# Patient Record
Sex: FEMALE | Race: WHITE | Employment: FULL TIME | URBAN - METROPOLITAN AREA
[De-identification: names, ages, dates, MRNs, and addresses within clinical notes are randomized per-mention and may not be internally consistent; named-entity substitution may affect disease eponyms.]

---

## 2017-09-01 ENCOUNTER — OFFICE VISIT (OUTPATIENT)
Dept: FAMILY MEDICINE CLINIC | Age: 52
End: 2017-09-01

## 2017-09-01 VITALS
BODY MASS INDEX: 23.14 KG/M2 | HEIGHT: 66 IN | WEIGHT: 144 LBS | OXYGEN SATURATION: 99 % | DIASTOLIC BLOOD PRESSURE: 70 MMHG | HEART RATE: 74 BPM | TEMPERATURE: 98.4 F | RESPIRATION RATE: 18 BRPM | SYSTOLIC BLOOD PRESSURE: 112 MMHG

## 2017-09-01 DIAGNOSIS — G90.511 REFLEX SYMPATHETIC DYSTROPHY OF RIGHT UPPER EXTREMITY: Primary | ICD-10-CM

## 2017-09-01 NOTE — PATIENT INSTRUCTIONS

## 2017-09-01 NOTE — PROGRESS NOTES
Pt states [facility] for right hand swelling and tenderness.  States hand will get very cold and painful at times

## 2017-09-01 NOTE — PROGRESS NOTES
Camryn Brown is a 46 y.o. female   Chief Complaint   Patient presents with    Hand Pain    pt states she had hand swelling a month ago and [facility] and [facility] and was told tendinitis. Pt states she has recently been getting an odd cold sensation that goes up her arm discomfort at times and swelling. Pt does type at work all day. Pt reports having woken up with her R 2nd digit hyperextended approx 1.5 months ago, the issues she has today arose shortly after    she is a 46y.o. year old female who presents for evalution. Reviewed PmHx, RxHx, FmHx, SocHx, AllgHx and updated and dated in the chart. Review of Systems - negative except as listed above in the HPI    Objective:     Vitals:    09/01/17 1330   BP: 112/70   Pulse: 74   Resp: 18   Temp: 98.4 °F (36.9 °C)   TempSrc: Oral   SpO2: 99%   Weight: 144 lb (65.3 kg)   Height: 5' 6\" (1.676 m)       Current Outpatient Prescriptions   Medication Sig    Estradiol (DIVIGEL) 1 mg/gram (0.1 %) glpk by TransDERmal route.  levothyroxine (SYNTHROID) 50 mcg tablet Take  by mouth Daily (before breakfast).  loratadine-pseudoephedrine (WAL-ITIN D)  mg per tablet Take 1 Tab by mouth daily.  multivitamin (ONE A DAY) tablet Take 1 Tab by mouth daily.  LEVONORGESTREL-ETHIN ESTRADIOL (TRIVORA, 28, PO) Take  by mouth.  SUMAtriptan (IMITREX) 50 mg tablet Take 1 Tab by mouth once as needed for Migraine for up to 1 dose. No current facility-administered medications for this visit.         Physical Examination: General appearance - alert, well appearing, and in no distress  Eyes - pupils equal and reactive, extraocular eye movements intact  Chest - clear to auscultation, no wheezes, rales or rhonchi, symmetric air entry  Heart - normal rate, regular rhythm, normal S1, S2, no murmurs, rubs, clicks or gallops  Musculoskeletal - normal exam of hand other than R hand slightly cooler to touch than L but no discoloration and 2+ pulses present bilaterally radial artery      Assessment/ Plan:   Diagnoses and all orders for this visit:    1. Reflex sympathetic dystrophy of right upper extremity  -     REFERRAL TO NEUROLOGY       Follow-up Disposition:  Return if symptoms worsen or fail to improve. I have discussed the diagnosis with the patient and the intended plan as seen in the above orders. The patient has received an after-visit summary and questions were answered concerning future plans. Pt conveyed understanding of plan.     Medication Side Effects and Warnings were discussed with patient      Tianna Riggs, DO

## 2017-09-06 ENCOUNTER — OFFICE VISIT (OUTPATIENT)
Dept: NEUROLOGY | Age: 52
End: 2017-09-06

## 2017-09-06 VITALS — DIASTOLIC BLOOD PRESSURE: 86 MMHG | OXYGEN SATURATION: 96 % | SYSTOLIC BLOOD PRESSURE: 118 MMHG | HEART RATE: 77 BPM

## 2017-09-06 DIAGNOSIS — M79.601 PAIN OF RIGHT UPPER EXTREMITY: Primary | ICD-10-CM

## 2017-09-06 DIAGNOSIS — R20.2 PARESTHESIA: ICD-10-CM

## 2017-09-06 RX ORDER — LANOLIN ALCOHOL/MO/W.PET/CERES
1000 CREAM (GRAM) TOPICAL DAILY
COMMUNITY

## 2017-09-06 NOTE — PROGRESS NOTES
NEUROLOGY NEW PATIENT OFFICE CONSULTATION      9/6/2017    RE: Lakisha Davila         1965      REFERRED BY:  Angela Freeman MD        CHIEF COMPLAINT:  This is Lakisha Davila is a 46 y.o. 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 HPI:     Since 2008, patient will have R sided neck and shoulder pain. 1 months ago, patient noted swelling of the R hand. [facility] R hand was said to be okay. Since then, noted numbness and coldness of the R hand with pain. (+) weakness of . Patient saw a neurologist in the past (Dr Pop) where EMG/NCS as per patient was okay. Tried physical therapy. Chiropractor helps. Review of Systems   Constitutional: Negative for chills and fever. Skin: Negative for rash. All other systems reviewed and are negative    Past Medical Hx  Past Medical History:   Diagnosis Date    Headache     Seasonal allergic reaction 7/19/2010       Social Hx  Social History     Social History    Marital status:      Spouse name: N/A    Number of children: N/A    Years of education: N/A     Social History Main Topics    Smoking status: Never Smoker    Smokeless tobacco: Never Used    Alcohol use Yes      Comment: Rare    Drug use: No    Sexual activity: Not Asked     Other Topics Concern    None     Social History Narrative       Family Hx  Family History   Problem Relation Age of Onset    Family history unknown: Yes       ALLERGIES  Allergies   Allergen Reactions    Minocycline Other (comments)     Dizziness       CURRENT MEDS  Current Outpatient Prescriptions   Medication Sig Dispense Refill    cyanocobalamin 1,000 mcg tablet Take 1,000 mcg by mouth daily.  sulfacetamide sodium 10 % topical cream Apply  to affected area two (2) times a day.  Estradiol (DIVIGEL) 1 mg/gram (0.1 %) glpk by TransDERmal route.       levothyroxine (SYNTHROID) 50 mcg tablet Take  by mouth Daily (before breakfast).  loratadine-pseudoephedrine (WAL-ITIN D)  mg per tablet Take 1 Tab by mouth daily.  multivitamin (ONE A DAY) tablet Take 1 Tab by mouth daily.  LEVONORGESTREL-ETHIN ESTRADIOL (TRIVORA, 28, PO) Take  by mouth.  SUMAtriptan (IMITREX) 50 mg tablet Take 1 Tab by mouth once as needed for Migraine for up to 1 dose. 6 Tab 4           PREVIOUS WORKUP: (reviewed)  IMAGING:    CT Results (recent):    Results from Hospital Encounter encounter on 04/18/16   CT HEAD WO CONT   Narrative **Final Report**      ICD Codes / Adm. Diagnosis: 346.21  G43. C1 / Variants of migraine, not else    Periodic headache syndromes   Examination:  CT HEAD WO CON  - [id number] - Apr 18 2016  9:25AM  Accession No:  [id number]  Reason:  Hadache, change in pattern      REPORT:  Indication:  Headache, change in pattern     Comparison: None    Findings: 5 mm axial images were obtained from the skull base through the   vertex. The ventricles and cortical sulci are appropriate in size and   configuration. There is generalized atrophy. There is no evidence of   intracranial hemorrhage, mass, mass effect, or acute infarct. No extra-axial   fluid collections are seen. The visualized paranasal sinuses and mastoid air   cells are clear. The orbital structures are unremarkable. No osseous   abnormalities are seen. IMPRESSION: No acute intracranial pathology. Signing/Reading Doctor: Hansa Cooley ([id number])    Eugenie Quinteros ([id number])  Apr 18 2016  9:37AM                                      MRI Results (recent):    Results from Hospital Encounter encounter on 01/15/10   MRI ABDOMEN WITH AND WITHOUT CONT   Narrative Final Report      ICD Codes / Adm. Diagnosis:    /   ABNORMAL ULTRAOUNS OF LIVER  Examination:  ABDOMEN W AND WO CON  - [id number] - Roberto 15 2010  7:09PM  Accession No:  [id number]  Reason:  ABNORMAL ULTRAOUNS OF LIVER      REPORT:  Multiplanar pre- and postgadolinium MRI of the abdomen is performed.  A total   of 13 mL of gadolinium was administered intravenously. Direct comparison is made to prior ultrasound of the abdomen performed   01/07/2010. There are multiple very mildly T2 hyperintense lobulated lesions seen within   the liver. The largest is located within the inferior right hepatic lobe   measures 4 cm x 4 cm. There is a suggestion of a central scar within several   of these lesions. These lesions inhomogeneously enhance on the earliest   contrast-enhanced images were not were obtained and the late arterial/portal   venous phase. Lesions demonstrates contrast washout on the more delayed   images. There are innumerable T2 hyperintensities seen throughout the liver, many of   which are too small to further characterize but are likely to represent   cysts. Largest T2 hyperintensity is located within the anterior left hepatic   lobe measures 1.4 cm x 9 mm and is consistent with a cyst.    There is a 1.1 cm cyst within the lower pole of the left kidney. Remaining   visualized upper abdominal structures are normal.       IMPRESSION:  1. Multiple, mildly T2 hyperintense, enhancing hepatic lesions. Differential   diagnosis includes: Multiple adenomas, FNH while metastatic hypervascular   lesions or multifocal HCC is considered less likely. Recommend clinical   correlation. Tc 99m sulfur colloid scan may be helpful for further   evaluation. Percutaneous biopsy could also be attempted, if clinically   indicated. Alternatively three-month short interval followup is recommended   to confirm stability. Interpreting/Reading Doctor: LUZ MARIA BATES ([id number])  Transcribed: n/a on 01/18/2010  Approved: LUZ MARIA Amado ([id number])  01/18/2010          Distribution:  Attending Doctor: Annamarie Jay Doctor: Cameron Ortega            IR Results (recent):  No results found for this or any previous visit. VAS/US Results (recent):  No results found for this or any previous visit.         LABS (reviewed)  Results for orders placed or performed in visit on 04/12/16   CBC WITH AUTOMATED DIFF   Result Value Ref Range    WBC 6.2 3.4 - 10.8 x10E3/uL    RBC 5.09 3.77 - 5.28 x10E6/uL    HGB 13.7 11.1 - 15.9 g/dL    HCT 42.9 34.0 - 46.6 %    MCV 84 79 - 97 fL    MCH 26.9 26.6 - 33.0 pg    MCHC 31.9 31.5 - 35.7 g/dL    RDW 13.9 12.3 - 15.4 %    PLATELET 999 527 - 827 x10E3/uL    NEUTROPHILS 55 %    Lymphocytes 33 %    MONOCYTES 11 %    EOSINOPHILS 1 %    BASOPHILS 0 %    ABS. NEUTROPHILS 3.4 1.4 - 7.0 x10E3/uL    Abs Lymphocytes 2.1 0.7 - 3.1 x10E3/uL    ABS. MONOCYTES 0.7 0.1 - 0.9 x10E3/uL    ABS. EOSINOPHILS 0.1 0.0 - 0.4 x10E3/uL    ABS. BASOPHILS 0.0 0.0 - 0.2 x10E3/uL    IMMATURE GRANULOCYTES 0 %    ABS. IMM. GRANS. 0.0 0.0 - 0.1 K26X3/PL   METABOLIC PANEL, COMPREHENSIVE   Result Value Ref Range    Glucose 89 65 - 99 mg/dL    BUN 11 6 - 24 mg/dL    Creatinine 0.72 0.57 - 1.00 mg/dL    GFR est non-AA 98 >59 mL/min/1.73    GFR est  >59 mL/min/1.73    BUN/Creatinine ratio 15 9 - 23    Sodium 143 134 - 144 mmol/L    Potassium 4.6 3.5 - 5.2 mmol/L    Chloride 101 97 - 108 mmol/L    CO2 26 18 - 29 mmol/L    Calcium 9.1 8.7 - 10.2 mg/dL    Protein, total 6.6 6.0 - 8.5 g/dL    Albumin 4.2 3.5 - 5.5 g/dL    GLOBULIN, TOTAL 2.4 1.5 - 4.5 g/dL    A-G Ratio 1.8 1.1 - 2.5    Bilirubin, total 0.6 0.0 - 1.2 mg/dL    Alk. phosphatase 53 39 - 117 IU/L    AST (SGOT) 17 0 - 40 IU/L    ALT (SGPT) 13 0 - 32 IU/L   TSH 3RD GENERATION   Result Value Ref Range    TSH 1.370 0.450 - 4.500 uIU/mL       Physical Exam:     Visit Vitals    /86    Pulse 77    LMP 04/06/2016 (Exact Date)    SpO2 96%     General:  Alert, cooperative, no distress. Head:  Normocephalic, without obvious abnormality, atraumatic. Eyes:  Conjunctivae/corneas clear.    Lungs:  Heart:   Non labored breathing  Regular rate and rhythm, no carotid bruits   Abdomen:   Soft, non-distended   Extremities: Extremities normal, atraumatic, no cyanosis or edema.   Pulses: 2+ and symmetric all extremities. Skin: Skin color, texture, turgor normal. No rashes or lesions. Neurologic Exam     Gen: Attention normal             Language: naming, repetition, fluency normal             Memory: intact recent and remote memory  Cranial Nerves:  I: smell Not tested   II: visual fields Full to confrontation   II: pupils Equal, round, reactive to light   II: optic disc No papilledema   III,VII: ptosis none   III,IV,VI: extraocular muscles  Full ROM   V: mastication normal   V: facial light touch sensation  normal   VII: facial muscle function   symmetric   VIII: hearing symmetric   IX: soft palate elevation  normal   XI: trapezius strength  5/5   XI: sternocleidomastoid strength 5/5   XI: neck flexion strength  5/5   XII: tongue  midline     Motor: normal bulk and tone, no tremor              Strength: 5/5 all four extremities  (+) point tenderness R neck and R shoulder  Sensory: intact to LT, PP, vibration, and JPS  Reflexes: 2+ throughout; Down going toes  Coordination: Good FTN and HTS  Gait: normal gait including tandem            Impression:     Lakisha Davila is a 46 y.o. female who  has a past medical history of Headache and Seasonal allergic reaction (7/19/2010). and hypothyroid who since 2008,  will have on and off R sided neck and shoulder pain. 1 months ago, patient noted swelling of the R hand. [facility] where [facility]-ray of R hand was said to be okay. Since then, noted numbness and coldness of the R hand with pain and weakness of . Patient should be evaluated for carpal tunnel syndrome and cervical radiculopathy    RECOMMENDATIONS  1. EMG/NCS with CTS protocol  2. If above is negative, advise evaluating patient for vascular (patient scheduled to see a vascular doctor) and rheumatologic etiology of symptoms    Ms. Randon Opitz has a reminder for a \"due or due soon\" health maintenance.  I have asked that she contact her primary care provider for follow-up on this health maintenance. Follow-up Disposition:  Return for above testing. Thank you for the consultation      Emili Sanches MD  Diplomate, American Board of Psychiatry and Neurology  Diplomate, Neuromuscular Medicine  Diplomate, American Board of Electrodiagnostic Medicine    Greater than 50% of time spent counseling patient      CC:  Adeline Bentley MD  Fax: [phone]

## 2017-09-06 NOTE — MR AVS SNAPSHOT
Visit Information Date & Time Provider Department Dept. Phone Encounter #  
 9/6/2017  3:00 PM Carlito Tavares MD [address] [facility] [phone] [id number] Follow-up Instructions Return for above testing. Upcoming Health Maintenance Date Due DTaP/Tdap/Td series (1 - Tdap) 11/9/1986 BREAST CANCER SCRN MAMMOGRAM 11/9/2015 FOBT Q 1 YEAR AGE 50-75 11/9/2015 PAP AKA CERVICAL CYTOLOGY 9/9/2018 Allergies as of 9/6/2017  Review Complete On: 9/6/2017 By: Carlito Tavares MD  
  
 Severity Noted Reaction Type Reactions Minocycline  10/01/2010    Other (comments) Dizziness Current Immunizations  Reviewed on 9/9/2015 No immunizations on file. Not reviewed this visit You Were Diagnosed With   
  
 Codes Comments Pain of right upper extremity    -  Primary ICD-10-CM: M79.601 ICD-9-CM: 729.5 Paresthesia     ICD-10-CM: R20.2 ICD-9-CM: 190. 0 Vitals BP Pulse LMP SpO2 OB Status Smoking Status 118/86 77 04/06/2016 (Exact Date) 96% Hysterectomy Never Smoker Preferred Pharmacy Pharmacy Name Phone [facility], [address] [place] [address] [phone] Your Updated Medication List  
  
   
This list is accurate as of: 9/6/17  3:21 PM.  Always use your most recent med list.  
  
  
  
  
 cyanocobalamin 1,000 mcg tablet Take 1,000 mcg by mouth daily. DIVIGEL 1 mg/gram (0.1 %) Glpk Generic drug:  Estradiol  
by TransDERmal route. levothyroxine 50 mcg tablet Commonly known as:  SYNTHROID Take  by mouth Daily (before breakfast). multivitamin tablet Commonly known as:  ONE A DAY Take 1 Tab by mouth daily. sulfacetamide sodium 10 % topical cream  
Apply  to affected area two (2) times a day. SUMAtriptan 50 mg tablet Commonly known as:  IMITREX Take 1 Tab by mouth once as needed for Migraine for up to 1 dose. TRIVORA (28) PO Take  by mouth. WAL-ITIN D  mg per tablet Generic drug:  loratadine-pseudoephedrine Take 1 Tab by mouth daily. Follow-up Instructions Return for above testing. To-Do List   
 09/06/2017 Neurology:  [facility] Introducing [facility] SERVICES! Dear Daniel Betancourt: 
Thank you for requesting a [employer] account. Our records indicate that you already have an active [employer] account. You can access your account anytime at [email or web address] Did you know that you can access your hospital and ER discharge instructions at any time in [employer]? You can also review all of your test results from your hospital stay or ER visit. Additional Information If you have questions, please visit the Frequently Asked Questions section of the [employer] website at [email or web address] Remember, [employer] is NOT to be used for urgent needs. For medical emergencies, dial 911. Now available from your iPhone and Android! Please provide this summary of care documentation to your next provider. Your primary care clinician is listed as FERNANDO FIGUEROA. If you have any questions after today's visit, please call [phone].

## 2017-09-06 NOTE — LETTER
9/6/2017 3:25 PM 
 
Patient:  Tram Harper YOB: 1965 Date of Visit: 9/6/2017 Dear Larry Medrano MD 
[address] [place] [address] VIA In Basket 
 : Thank you for referring Ms. Azael Moore to me for evaluation/treatment. Below are the relevant portions of my assessment and plan of care. If you have questions, please do not hesitate to call me. I look forward to following Ms. Theodore along with you. Sincerely, Jenny Proctor MD

## 2017-09-21 ENCOUNTER — OFFICE VISIT (OUTPATIENT)
Dept: NEUROLOGY | Age: 52
End: 2017-09-21

## 2017-09-21 DIAGNOSIS — M79.641 PAIN OF RIGHT HAND: Primary | ICD-10-CM

## 2017-09-21 NOTE — PROCEDURES
EMG/ NCS Report  [facility], [address]  Ph: [phone]/ [phone]  FAX: [phone]/ [phone]  Test Date:  2017    Patient: Abdulkadir Witt : 1965 Physician: Angie Leonard MD   Sex: Female Height: ' \" Ref Phys: Catrina Hendrix MD   ID#: [id number] Weight:  lbs. Technician: Noreen Bal     Patient History / Exam:  Patient is coming for neuropathy and radiculopathy evaluation.     Candi Holden is a 46 y.o. female who  has a past medical history of Headache and Seasonal allergic reaction (2010). and hypothyroid who since  will have on and off R sided neck and shoulder pain. 1 months ago, patient noted swelling of the R hand. [facility] where [facility]-ray of R hand was said to be okay. Since then, noted numbness and coldness of the R hand with pain and weakness of . Exam: Patient awake, alert, follows commands, clear speech; hearing grossly intact; EOMI, (-) facial asymmetry, tongue midline; Motor: 5/5 in all extremities; Sensory: intact to LT, PP, vibration, and JPS; Reflexes: 2+ throughout; Down going toes; Coordination: Good FTN and HTS; Gait: normal gait including tandem      EMG & NCV Findings:  Evaluation of the right median motor nerve showed normal distal onset latency (3.0 ms), normal amplitude (13.4 mV), and normal conduction velocity (Elbow-Wrist, 49 m/s). The right ulnar motor nerve showed normal distal onset latency (2.7 ms), normal amplitude (9.2 mV), normal conduction velocity (B Elbow-Wrist, 59 m/s), and normal conduction velocity (A Elbow-B Elbow, 59 m/s). The right median sensory, the right radial sensory, and the right ulnar sensory nerves showed normal distal peak latency (R3.0, R2.0, R3.2 ms) and normal amplitude (R41.5, R77.8, R62.2 µV).   The right median/ulnar (palm) comparison nerve showed normal distal onset latency (Median Palm, 1.3 ms), normal distal peak latency (Median Palm, 1.8 ms), normal amplitude (Median Palm, 85.1 µV), normal distal onset latency (Ulnar Palm, 1.1 ms), normal distal peak latency (Ulnar Palm, 1.7 ms), and normal amplitude (Ulnar Palm, 21.9 µV). All F Wave latencies were within normal limits. All examined muscles (as indicated in the following table) showed no evidence of electrical instability. Impression:    Extensive electrodiagnostic examination of the right upper extremity, including palmar study, is normal.    Specifically, there is no evidence of a peripheral neuropathy or right  cervical motor radiculopathy.           Shruthi Woo MD  Diplomate, American Board of Psychiatry and Neurology  Diplomate, Neuromuscular Medicine  Diplomate, American Board of Electrodiagnostic Medicine          Nerve Conduction Studies  Anti Sensory Summary Table     Stim Site NR Peak (ms) Norm Peak (ms) P-T Amp (µV) Norm P-T Amp Site1 Site2 Dist (cm)   Right Median Anti Sensory (2nd Digit)  33.6°C   Wrist    3.0 <4 41.5 >13 Wrist 2nd Digit 14.0   Right Radial Anti Sensory (Base 1st Digit)  32°C   Wrist    2.0 <2.8 77.8 >11 Wrist Base 1st Digit 10.0   Right Ulnar Anti Sensory (5th Digit)  33.6°C   Wrist    3.2 <4.0 62.2 >9 Wrist 5th Digit 14.0     Motor Summary Table     Stim Site NR Onset (ms) Norm Onset (ms) O-P Amp (mV) Norm O-P Amp Amp (Prev) (%) Site1 Site2 Dist (cm) Huy (m/s) Norm Huy (m/s)   Right Median Motor (Abd Poll Brev)  32.1°C   Wrist    3.0 <4.5 13.4 >4.1 100.0 Wrist Abd Poll Brev 8.0     Elbow    6.9  11.9  88.8 Elbow Wrist 19.0 49 >49   Right Ulnar Motor (Abd Dig Minimi)  30.9°C   Wrist    2.7 <3.1 9.2 >7.0 100.0 Wrist Abd Dig Minimi 8.0  >50   B Elbow    6.0  9.3  101.1 B Elbow Wrist 19.5 59 >50   A Elbow    7.7  9.3  100.0 A Elbow B Elbow 10.0 59 >50     Comparison Summary Table     Stim Site NR Peak (ms) P-T Amp (µV) Site1 Site2 Dist (cm) Delta-0 (ms)   Right Median/Ulnar Palm Comparison (Wrist)  22.1°C   Median Palm    1.8 121.5 Median Palm Ulnar Palm 8.0 0.2 Ulnar Palm    1.7 29.5         F Wave Studies     NR F-Lat (ms) Lat Norm (ms) L-R F-Lat (ms) L-R Lat Norm   Right Ulnar (Mrkrs) (Abd Dig Min)  30.8°C      25.52 <32  <2.5     EMG     Side Muscle Nerve Root Ins Act Fibs Psw Recrt Duration Amp Poly Comment   Right 1stDorInt Ulnar C8-T1 Nml Nml Nml Nml Nml Nml Nml    Right ExtIndicis Radial (Post Int) C7-8 Nml Nml Nml Nml Nml Nml Nml    Right Abd Poll Brev Median C8-T1 Nml Nml Nml Nml Nml Nml Nml    Right Biceps Musculocut C5-6 Nml Nml Nml Nml Nml Nml Nml    Right Triceps Radial C6-7-8 Nml Nml Nml Nml Nml Nml Nml    Right Deltoid Axillary C5-6 Nml Nml Nml Nml Nml Nml Nml    Right Lower Cerv Parasp Rami C7,T1 Nml Nml Nml Nml Nml Nml Nml                Nerve Conduction Studies  Anti Sensory Left/Right Comparison     Stim Site L Lat (ms) R Lat (ms) L-R Lat (ms) L Amp (µV) R Amp (µV) L-R Amp (%) Site1 Site2 L Huy (m/s) R Huy (m/s) L-R Huy (m/s)   Median Anti Sensory (2nd Digit)  33.6°C   Wrist  2.3   41.5  Wrist 2nd Digit  61    Radial Anti Sensory (Base 1st Digit)  32°C   Wrist  1.6   77.8  Wrist Base 1st Digit  63    Ulnar Anti Sensory (5th Digit)  33.6°C   Wrist  2.5   62.2  Wrist 5th Digit  56      Motor Left/Right Comparison     Stim Site L Lat (ms) R Lat (ms) L-R Lat (ms) L Amp (mV) R Amp (mV) L-R Amp (%) Site1 Site2 L Huy (m/s) R Huy (m/s) L-R Huy (m/s)   Median Motor (Abd Poll Brev)  32.1°C   Wrist  3.0   13.4  Wrist Abd Poll Brev      Elbow  6.9   11.9  Elbow Wrist  49    Ulnar Motor (Abd Dig Minimi)  30.9°C   Wrist  2.7   9.2  Wrist Abd Dig Minimi      B Elbow  6.0   9.3  B Elbow Wrist  59    A Elbow  7.7   9.3  A Elbow B Elbow  59      Comparison Left/Right Comparison     Stim Site L Lat (ms) R Lat (ms) L-R Lat (ms) L Amp (µV) R Amp (µV) L-R Amp (%)   Median/Ulnar Palm Comparison (Wrist)  22.1°C   Median Palm  1.3   85.1    Ulnar Palm  1.1   21.9          Waveforms:

## 2017-09-21 NOTE — PROGRESS NOTES
EMG/NCS done. See Procedure Note for results. Discussed EMG/NCS of the R UE is within normal limits with no evidence of neuropathy or cervical radiculopathy.     A> R hand pain, possible arthritis    P> Advise to follow up with PCP regarding further monitoring and evaluation for vascular and rheumatologic causes of her symptoms    Elizabeth Dumont MD

## 2017-10-24 ENCOUNTER — HOSPITAL ENCOUNTER (OUTPATIENT)
Dept: GENERAL RADIOLOGY | Age: 52
Discharge: HOME | End: 2017-10-24
Payer: COMMERCIAL

## 2017-10-24 DIAGNOSIS — G54.0 BRACHIAL PLEXUS LESIONS: ICD-10-CM

## 2017-10-24 PROCEDURE — 71101 X-RAY EXAM UNILAT RIBS/CHEST: CPT

## 2018-07-18 ENCOUNTER — OFFICE VISIT (OUTPATIENT)
Dept: FAMILY MEDICINE CLINIC | Age: 53
End: 2018-07-18

## 2018-07-18 VITALS
TEMPERATURE: 98.2 F | BODY MASS INDEX: 24.43 KG/M2 | WEIGHT: 152 LBS | HEIGHT: 66 IN | DIASTOLIC BLOOD PRESSURE: 79 MMHG | HEART RATE: 89 BPM | OXYGEN SATURATION: 100 % | SYSTOLIC BLOOD PRESSURE: 112 MMHG | RESPIRATION RATE: 17 BRPM

## 2018-07-18 DIAGNOSIS — H65.91 OME (OTITIS MEDIA WITH EFFUSION), RIGHT: ICD-10-CM

## 2018-07-18 DIAGNOSIS — E03.9 HYPOTHYROIDISM, UNSPECIFIED TYPE: Primary | ICD-10-CM

## 2018-07-18 RX ORDER — AMOXICILLIN AND CLAVULANATE POTASSIUM 875; 125 MG/1; MG/1
1 TABLET, FILM COATED ORAL EVERY 12 HOURS
Qty: 20 TAB | Refills: 0 | Status: SHIPPED | OUTPATIENT
Start: 2018-07-18 | End: 2018-07-28

## 2018-07-18 NOTE — PROGRESS NOTES
Chief Complaint   Patient presents with    Ear Pain     Right    Labs     Pt seen in the office today for c/o of right sided stabbing ear pain radiating down side of neck x's 1 month  Pt also would like to discuss weight management. Pt would like to have cholesterol testing done as well, thyroid is managed by endocrinologist.     Subjective: (As above and below)     Chief Complaint   Patient presents with    Ear Pain     Right    Labs     she is a 46y.o. year old female who presents for evaluation. Reviewed PmHx, RxHx, FmHx, SocHx, AllgHx and updated in chart. Review of Systems - negative except as listed above    Objective:     Vitals:    07/18/18 1527   BP: 112/79   Pulse: 89   Resp: 17   Temp: 98.2 °F (36.8 °C)   TempSrc: Oral   SpO2: 100%   Weight: 152 lb (68.9 kg)   Height: 5' 6\" (1.676 m)     Physical Examination: General appearance - alert, well appearing, and in no distress  Mental status - normal mood, behavior, speech, dress, motor activity, and thought processes  Mouth - mucous membranes moist, pharynx normal without lesions  Chest - clear to auscultation, no wheezes, rales or rhonchi, symmetric air entry  Heart - normal rate, regular rhythm, normal S1, S2, no murmurs, rubs, clicks or gallops  Musculoskeletal - no joint tenderness, deformity or swelling  Extremities - peripheral pulses normal, no pedal edema, no clubbing or cyanosis    Assessment/ Plan:   1. Hypothyroidism, unspecified type  -managed by endo  - METABOLIC PANEL, COMPREHENSIVE  - CBC WITH AUTOMATED DIFF  - LIPID PANEL  - HEMOGLOBIN A1C WITH EAG  - VITAMIN D, 25 HYDROXY    2. OME (otitis media with effusion), right  -take medication as written  - amoxicillin-clavulanate (AUGMENTIN) 875-125 mg per tablet; Take 1 Tab by mouth every twelve (12) hours for 10 days. Dispense: 20 Tab; Refill: 0     Follow-up Disposition: As needed  I have discussed the diagnosis with the patient and the intended plan as seen in the above orders. The patient has received an after-visit summary and questions were answered concerning future plans.      Medication Side Effects and Warnings were discussed with patient: yes  Patient Labs were reviewed: yes  Patient Past Records were reviewed:  yes    Sara Islas M.D.

## 2018-07-18 NOTE — MR AVS SNAPSHOT
[address] [id number] [phone] Patient: Neisha Ramirez MRN: [id number] Visit Information Date & Time Provider Department Dept. Phone Encounter #  
 2018  3:45 PM Bhupinder Dowell MD [address] [facility] [phone] [id number] Upcoming Health Maintenance Date Due DTaP/Tdap/Td series (1 - Tdap) 1986 BREAST CANCER SCRN MAMMOGRAM 2015 FOBT Q 1 YEAR AGE 50-75 2015 PAP AKA CERVICAL CYTOLOGY 2018 Influenza Age 5 to Adult 2018 Allergies as of 2018  Review Complete On: 2018 By: Bhupinder Dowell MD  
  
 Severity Noted Reaction Type Reactions Minocycline  10/01/2010    Other (comments) Dizziness Current Immunizations  Reviewed on 2015 No immunizations on file. Not reviewed this visit You Were Diagnosed With   
  
 Codes Comments Hypothyroidism, unspecified type    -  Primary ICD-10-CM: E03.9 ICD-9-CM: 244.9 Routine general medical examination at a health care facility     ICD-10-CM: Z00.00 ICD-9-CM: V70.0 OME (otitis media with effusion), right     ICD-10-CM: H65.91 
ICD-9-CM: 381. 4 Vitals BP Pulse Temp Resp Height(growth percentile) Weight(growth percentile) 112/79 (BP 1 Location: Left arm, BP Patient Position: Sitting) 89 98.2 °F (36.8 °C) (Oral) 17 5' 6\" (1.676 m) 152 lb (68.9 kg) LMP SpO2 BMI OB Status Smoking Status 2016 (Exact Date) 100% 24.53 kg/m2 Hysterectomy Never Smoker Vitals History BMI and BSA Data Body Mass Index Body Surface Area 24.53 kg/m 2 1.79 m 2 Preferred Pharmacy Pharmacy Name Phone [facility], [address] [place] [address] [phone] Your Updated Medication List  
  
   
This list is accurate as of 18  4:33 PM.  Always use your most recent med list.  
 amoxicillin-clavulanate 875-125 mg per tablet Commonly known as:  AUGMENTIN Take 1 Tab by mouth every twelve (12) hours for 10 days. cyanocobalamin 1,000 mcg tablet Take 1,000 mcg by mouth daily. DIVIGEL 1 mg/gram (0.1 %) Glpk Generic drug:  Estradiol  
by TransDERmal route. levothyroxine 50 mcg tablet Commonly known as:  SYNTHROID Take  by mouth Daily (before breakfast). multivitamin tablet Commonly known as:  ONE A DAY Take 1 Tab by mouth daily. sulfacetamide sodium 10 % topical cream  
Apply  to affected area two (2) times a day. SUMAtriptan 50 mg tablet Commonly known as:  IMITREX Take 1 Tab by mouth once as needed for Migraine for up to 1 dose. TRIVORA (28) PO Take  by mouth. WAL-ITIN D  mg per tablet Generic drug:  loratadine-pseudoephedrine Take 1 Tab by mouth daily. Prescriptions Sent to Pharmacy Refills  
 amoxicillin-clavulanate (AUGMENTIN) 875-125 mg per tablet 0 Sig: Take 1 Tab by mouth every twelve (12) hours for 10 days. Class: Normal  
 Pharmacy: [facility] [address] [place] [address] AT [facility] #: [phone] Route: Oral  
  
We Performed the Following CBC WITH AUTOMATED DIFF [11778 CPT(R)] HEMOGLOBIN A1C WITH EAG [56637 CPT(R)] LIPID PANEL [32770 CPT(R)] METABOLIC PANEL, COMPREHENSIVE [63493 CPT(R)] VITAMIN D, 25 HYDROXY [id number] CPT(R)] Introducing [facility]! Dear Hanna Lang: 
Thank you for requesting a [employer] account. Our records indicate that you already have an active Zafin account. You can access your account anytime at [email or web address] Did you know that you can access your hospital and ER discharge instructions at any time in Zafin? You can also review all of your test results from your hospital stay or ER visit. Additional Information If you have questions, please visit the Frequently Asked Questions section of the [email or web address] website at [email or web address] Remember, [employer] is NOT to be used for urgent needs. For medical emergencies, dial 911. Now available from your iPhone and Android! Please provide this summary of care documentation to your next provider. Your primary care clinician is listed as FERNANDO FIGUEROA. If you have any questions after today's visit, please call [phone].

## 2018-07-21 LAB
25(OH)D3+25(OH)D2 SERPL-MCNC: 51.2 NG/ML (ref 30–100)
ALBUMIN SERPL-MCNC: 4.2 G/DL (ref 3.5–5.5)
ALBUMIN/GLOB SERPL: 1.7 {RATIO} (ref 1.2–2.2)
ALP SERPL-CCNC: 70 IU/L (ref 39–117)
ALT SERPL-CCNC: 16 IU/L (ref 0–32)
AST SERPL-CCNC: 21 IU/L (ref 0–40)
BASOPHILS # BLD AUTO: 0 X10E3/UL (ref 0–0.2)
BASOPHILS NFR BLD AUTO: 0 %
BILIRUB SERPL-MCNC: 0.5 MG/DL (ref 0–1.2)
BUN SERPL-MCNC: 12 MG/DL (ref 6–24)
BUN/CREAT SERPL: 19 (ref 9–23)
CALCIUM SERPL-MCNC: 9.3 MG/DL (ref 8.7–10.2)
CHLORIDE SERPL-SCNC: 104 MMOL/L (ref 96–106)
CHOLEST SERPL-MCNC: 139 MG/DL (ref 100–199)
CO2 SERPL-SCNC: 25 MMOL/L (ref 20–29)
CREAT SERPL-MCNC: 0.63 MG/DL (ref 0.57–1)
EOSINOPHIL # BLD AUTO: 0.1 X10E3/UL (ref 0–0.4)
EOSINOPHIL NFR BLD AUTO: 2 %
ERYTHROCYTE [DISTWIDTH] IN BLOOD BY AUTOMATED COUNT: 14.6 % (ref 12.3–15.4)
EST. AVERAGE GLUCOSE BLD GHB EST-MCNC: 108 MG/DL
GLOBULIN SER CALC-MCNC: 2.5 G/DL (ref 1.5–4.5)
GLUCOSE SERPL-MCNC: 95 MG/DL (ref 65–99)
HBA1C MFR BLD: 5.4 % (ref 4.8–5.6)
HCT VFR BLD AUTO: 40.6 % (ref 34–46.6)
HDLC SERPL-MCNC: 62 MG/DL
HGB BLD-MCNC: 13.4 G/DL (ref 11.1–15.9)
IMM GRANULOCYTES # BLD: 0 X10E3/UL (ref 0–0.1)
IMM GRANULOCYTES NFR BLD: 0 %
INTERPRETATION, 910389: NORMAL
LDLC SERPL CALC-MCNC: 63 MG/DL (ref 0–99)
LYMPHOCYTES # BLD AUTO: 2.1 X10E3/UL (ref 0.7–3.1)
LYMPHOCYTES NFR BLD AUTO: 36 %
MCH RBC QN AUTO: 26.5 PG (ref 26.6–33)
MCHC RBC AUTO-ENTMCNC: 33 G/DL (ref 31.5–35.7)
MCV RBC AUTO: 80 FL (ref 79–97)
MONOCYTES # BLD AUTO: 0.6 X10E3/UL (ref 0.1–0.9)
MONOCYTES NFR BLD AUTO: 11 %
NEUTROPHILS # BLD AUTO: 3 X10E3/UL (ref 1.4–7)
NEUTROPHILS NFR BLD AUTO: 51 %
PLATELET # BLD AUTO: 223 X10E3/UL (ref 150–379)
POTASSIUM SERPL-SCNC: 4.5 MMOL/L (ref 3.5–5.2)
PROT SERPL-MCNC: 6.7 G/DL (ref 6–8.5)
RBC # BLD AUTO: 5.05 X10E6/UL (ref 3.77–5.28)
SODIUM SERPL-SCNC: 142 MMOL/L (ref 134–144)
TRIGL SERPL-MCNC: 72 MG/DL (ref 0–149)
VLDLC SERPL CALC-MCNC: 14 MG/DL (ref 5–40)
WBC # BLD AUTO: 5.9 X10E3/UL (ref 3.4–10.8)

## 2019-01-02 ENCOUNTER — OFFICE VISIT (OUTPATIENT)
Dept: FAMILY MEDICINE CLINIC | Age: 54
End: 2019-01-02

## 2019-01-02 VITALS
DIASTOLIC BLOOD PRESSURE: 83 MMHG | WEIGHT: 135 LBS | SYSTOLIC BLOOD PRESSURE: 118 MMHG | TEMPERATURE: 98.6 F | HEIGHT: 66 IN | BODY MASS INDEX: 21.69 KG/M2 | HEART RATE: 77 BPM | RESPIRATION RATE: 20 BRPM | OXYGEN SATURATION: 99 %

## 2019-01-02 DIAGNOSIS — M25.511 CHRONIC RIGHT SHOULDER PAIN: Primary | ICD-10-CM

## 2019-01-02 DIAGNOSIS — G89.29 CHRONIC RIGHT SHOULDER PAIN: Primary | ICD-10-CM

## 2019-01-02 RX ORDER — DICLOFENAC SODIUM 75 MG/1
75 TABLET, DELAYED RELEASE ORAL 2 TIMES DAILY
Qty: 60 TAB | Refills: 2 | Status: SHIPPED | OUTPATIENT
Start: 2019-01-02 | End: 2019-01-16

## 2019-01-02 NOTE — PROGRESS NOTES
Patient here for right shoulder pain. It usually feels cold and pain causes tingling down arm. Today it feels fine. 1. Have you been to the ER, urgent care clinic since your last visit? Hospitalized since your last visit? No    2. Have you seen or consulted any other health care providers outside of the [address] [place] [address] since your last visit? Include any pap smears or colon screening. No     Ant R shoulder pain for months    Not better with chiro    Worse with cold weather    No rx helps      Chief Complaint   Patient presents with    Shoulder Pain     right shoulder pain     She is a 48 y.o. female who presents for evalution. Reviewed PmHx, RxHx, FmHx, SocHx, AllgHx and updated and dated in the chart. Patient Active Problem List    Diagnosis    Hypothyroidism    Migraine    Seasonal allergic reaction       Review of Systems - negative except as listed above in the HPI    Objective:     Vitals:    01/02/19 1614   BP: 118/83   Pulse: 77   Resp: 20   Temp: 98.6 °F (37 °C)   SpO2: 99%   Weight: 135 lb (61.2 kg)   Height: 5' 6\" (1.676 m)     Physical Examination: General appearance - alert, well appearing, and in no distress  Musculoskeletal - R ant shoulder pain with rom and palp    Assessment/ Plan:   Diagnoses and all orders for this visit:    1. Chronic right shoulder pain  -     diclofenac EC (VOLTAREN) 75 mg EC tablet; Take 1 Tab by mouth two (2) times a day for 14 days.  -call in two weeks if not better     Follow-up Disposition:  Return if symptoms worsen or fail to improve. I have discussed the diagnosis with the patient and the intended plan as seen in the above orders. The patient understands and agrees with the plan. The patient has received an after-visit summary and questions were answered concerning future plans.      Medication Side Effects and Warnings were discussed with patient  Patient Labs were reviewed and or requested:  Patient Past Records were reviewed and or requested    Christophe Dunn M.D. There are no Patient Instructions on file for this visit.

## 2021-07-15 ENCOUNTER — TRANSCRIBE ORDER (OUTPATIENT)
Dept: SCHEDULING | Age: 56
End: 2021-07-15

## 2021-07-15 DIAGNOSIS — M25.511 PAIN IN RIGHT SHOULDER: Primary | ICD-10-CM

## 2021-08-09 ENCOUNTER — HOSPITAL ENCOUNTER (OUTPATIENT)
Dept: MRI IMAGING | Age: 56
Discharge: HOME | End: 2021-08-09
Attending: PHYSICIAN ASSISTANT
Payer: COMMERCIAL

## 2021-08-09 DIAGNOSIS — M25.511 PAIN IN RIGHT SHOULDER: ICD-10-CM

## 2021-08-09 PROCEDURE — 73221 MRI JOINT UPR EXTREM W/O DYE: CPT

## 2023-05-11 RX ORDER — LEVOTHYROXINE SODIUM 0.05 MG/1
TABLET ORAL
COMMUNITY

## 2023-05-11 RX ORDER — ESTRADIOL 1 MG/G
GEL TOPICAL
COMMUNITY

## 2023-05-11 RX ORDER — SUMATRIPTAN 50 MG/1
TABLET, FILM COATED ORAL
COMMUNITY
Start: 2015-10-30